# Patient Record
Sex: MALE | Race: WHITE | NOT HISPANIC OR LATINO | URBAN - NONMETROPOLITAN AREA
[De-identification: names, ages, dates, MRNs, and addresses within clinical notes are randomized per-mention and may not be internally consistent; named-entity substitution may affect disease eponyms.]

---

## 2017-01-26 ENCOUNTER — LAB (OUTPATIENT)
Dept: LAB | Facility: HOSPITAL | Age: 27
End: 2017-01-26

## 2017-01-26 ENCOUNTER — TRANSCRIBE ORDERS (OUTPATIENT)
Dept: LAB | Facility: HOSPITAL | Age: 27
End: 2017-01-26

## 2017-01-26 DIAGNOSIS — Z91.89 AT RISK FOR INFECTIOUS DISEASE DUE TO RECENT FOREIGN TRAVEL: Primary | ICD-10-CM

## 2017-01-26 DIAGNOSIS — Z91.89 AT RISK FOR INFECTIOUS DISEASE DUE TO RECENT FOREIGN TRAVEL: ICD-10-CM

## 2017-01-26 PROCEDURE — 85041 AUTOMATED RBC COUNT: CPT | Performed by: NURSE PRACTITIONER

## 2017-01-26 PROCEDURE — 36415 COLL VENOUS BLD VENIPUNCTURE: CPT

## 2017-01-26 PROCEDURE — 82955 ASSAY OF G6PD ENZYME: CPT | Performed by: NURSE PRACTITIONER

## 2017-01-29 LAB
G6PD RBC-CCNC: 296 U/10E12 RBC (ref 146–376)
RBC # BLD AUTO: 4.73 X10E6/UL (ref 4.14–5.8)

## 2017-10-04 ENCOUNTER — OFFICE VISIT (OUTPATIENT)
Dept: RETAIL CLINIC | Facility: CLINIC | Age: 27
End: 2017-10-04

## 2017-10-04 DIAGNOSIS — Z02.1 DRUG SCREENING, PRE-EMPLOYMENT: Primary | ICD-10-CM

## 2017-10-04 NOTE — PROGRESS NOTES
Lulú Che is a 27 y.o. male.     Reason for Appointment  1. escreen    History of Present Illness  HPI:   See scanned document. See custody control form.    Assessments  1. Encounter for screening, unspecified - Z13.9    This document has been electronically signed by ADDIS Milan October 4, 2017 10:18 AM